# Patient Record
Sex: FEMALE | Race: BLACK OR AFRICAN AMERICAN | NOT HISPANIC OR LATINO | URBAN - METROPOLITAN AREA
[De-identification: names, ages, dates, MRNs, and addresses within clinical notes are randomized per-mention and may not be internally consistent; named-entity substitution may affect disease eponyms.]

---

## 2021-02-04 ENCOUNTER — LAB OUTSIDE AN ENCOUNTER (OUTPATIENT)
Dept: URBAN - METROPOLITAN AREA CLINIC 113 | Facility: CLINIC | Age: 26
End: 2021-02-04

## 2021-02-04 ENCOUNTER — WEB ENCOUNTER (OUTPATIENT)
Dept: URBAN - METROPOLITAN AREA CLINIC 113 | Facility: CLINIC | Age: 26
End: 2021-02-04

## 2021-02-04 ENCOUNTER — OFFICE VISIT (OUTPATIENT)
Dept: URBAN - METROPOLITAN AREA CLINIC 113 | Facility: CLINIC | Age: 26
End: 2021-02-04
Payer: COMMERCIAL

## 2021-02-04 VITALS
DIASTOLIC BLOOD PRESSURE: 72 MMHG | HEIGHT: 65 IN | SYSTOLIC BLOOD PRESSURE: 121 MMHG | WEIGHT: 213 LBS | BODY MASS INDEX: 35.49 KG/M2 | TEMPERATURE: 98 F | HEART RATE: 92 BPM

## 2021-02-04 DIAGNOSIS — K62.5 BRBPR (BRIGHT RED BLOOD PER RECTUM): ICD-10-CM

## 2021-02-04 DIAGNOSIS — R10.13 DYSPEPSIA: ICD-10-CM

## 2021-02-04 PROCEDURE — 83014 H PYLORI DRUG ADMIN: CPT | Performed by: INTERNAL MEDICINE

## 2021-02-04 PROCEDURE — 83013 H PYLORI (C-13) BREATH: CPT | Performed by: INTERNAL MEDICINE

## 2021-02-04 PROCEDURE — 99244 OFF/OP CNSLTJ NEW/EST MOD 40: CPT | Performed by: INTERNAL MEDICINE

## 2021-02-04 PROCEDURE — G8483 FLU IMM NO ADMIN DOC REA: HCPCS | Performed by: INTERNAL MEDICINE

## 2021-02-04 RX ORDER — SODIUM, POTASSIUM,MAG SULFATES 17.5-3.13G
354 ML SOLUTION, RECONSTITUTED, ORAL ORAL
Qty: 354 ML | Refills: 0 | OUTPATIENT
Start: 2021-02-04 | End: 2021-02-05

## 2021-02-04 NOTE — HPI-TODAY'S VISIT:
The patient was referred by Dr. Poole for blood per rectum .   A copy of this document is being forwarded to the referring provider.

## 2021-02-04 NOTE — HPI-TODAY'S VISIT:
Ms. Foley is a 25-year-old woman with no chronic medical problems presenting for evaluation of blood in her stool. She reports being in her usual state of health until about 3 weeks ago when she had several episodes of red blood per rectum with a bowel movement.  The blood was in the commode and on the tissue paper.  She denies any accompanying perianal burning, itching or pain.  She has occasional episodes where her stool is harder in consistency.  She believes that the blood per rectum may have coincided with straining to pass a hard consistency bowel movement.  Her bowel move,ents are typicaaly fairly regular and normal consistency.  She denies any abdominal pain.  There is no family history of colon polyps or colorectal cancer, no inflammatory bowel disease and she denies any antecedent antibiotic use.   She also reports having issues with nausea but no vomiting.  This is been a problem has been going on for several months.  She denies any heartburn or difficulty swallowing.  She has not been taking any new medications or herbal products to coincide with the nausea.  She has some upper abdominal discomfort, but no abdominal pain. Review of records: Labs on 10/6/20 show normal CBC, BMP, LFTs, TSH

## 2021-02-05 ENCOUNTER — LAB OUTSIDE AN ENCOUNTER (OUTPATIENT)
Dept: URBAN - METROPOLITAN AREA CLINIC 113 | Facility: CLINIC | Age: 26
End: 2021-02-05

## 2021-02-08 ENCOUNTER — TELEPHONE ENCOUNTER (OUTPATIENT)
Dept: URBAN - METROPOLITAN AREA CLINIC 113 | Facility: CLINIC | Age: 26
End: 2021-02-08

## 2021-02-10 ENCOUNTER — TELEPHONE ENCOUNTER (OUTPATIENT)
Dept: URBAN - METROPOLITAN AREA CLINIC 113 | Facility: CLINIC | Age: 26
End: 2021-02-10

## 2021-02-16 ENCOUNTER — OFFICE VISIT (OUTPATIENT)
Dept: URBAN - METROPOLITAN AREA SURGERY CENTER 25 | Facility: SURGERY CENTER | Age: 26
End: 2021-02-16
Payer: COMMERCIAL

## 2021-02-16 DIAGNOSIS — K64.0 FIRST DEGREE HEMORRHOIDS: ICD-10-CM

## 2021-02-16 DIAGNOSIS — K92.1 BLACK STOOL: ICD-10-CM

## 2021-02-16 PROBLEM — 74474003: Status: ACTIVE | Noted: 2021-02-16

## 2021-02-16 PROBLEM — 162031009: Status: ACTIVE | Noted: 2021-02-16

## 2021-02-16 PROCEDURE — G8907 PT DOC NO EVENTS ON DISCHARG: HCPCS | Performed by: INTERNAL MEDICINE

## 2021-02-16 PROCEDURE — 45330 DIAGNOSTIC SIGMOIDOSCOPY: CPT | Performed by: INTERNAL MEDICINE

## 2021-03-16 ENCOUNTER — DASHBOARD ENCOUNTERS (OUTPATIENT)
Age: 26
End: 2021-03-16

## 2021-03-16 ENCOUNTER — OFFICE VISIT (OUTPATIENT)
Dept: URBAN - METROPOLITAN AREA CLINIC 113 | Facility: CLINIC | Age: 26
End: 2021-03-16

## 2021-03-28 LAB
H PYLORI BREATH TEST: NEGATIVE
H. PYLORI BREATH COLLECTION: (no result)